# Patient Record
Sex: FEMALE
[De-identification: names, ages, dates, MRNs, and addresses within clinical notes are randomized per-mention and may not be internally consistent; named-entity substitution may affect disease eponyms.]

---

## 2018-01-05 ENCOUNTER — HOSPITAL ENCOUNTER (OUTPATIENT)
Dept: HOSPITAL 90 - EDH | Age: 23
Setting detail: OBSERVATION
LOS: 1 days | Discharge: HOME | End: 2018-01-06
Attending: OBSTETRICS & GYNECOLOGY | Admitting: OBSTETRICS & GYNECOLOGY
Payer: MEDICAID

## 2018-01-05 VITALS — BODY MASS INDEX: 31.82 KG/M2 | HEIGHT: 65 IN | WEIGHT: 191 LBS

## 2018-01-05 DIAGNOSIS — N93.9: Primary | ICD-10-CM

## 2018-01-05 PROCEDURE — 99285 EMERGENCY DEPT VISIT HI MDM: CPT

## 2018-01-05 PROCEDURE — 86850 RBC ANTIBODY SCREEN: CPT

## 2018-01-05 PROCEDURE — 80053 COMPREHEN METABOLIC PANEL: CPT

## 2018-01-05 PROCEDURE — 36415 COLL VENOUS BLD VENIPUNCTURE: CPT

## 2018-01-05 PROCEDURE — 85025 COMPLETE CBC W/AUTO DIFF WBC: CPT

## 2018-01-05 PROCEDURE — 81025 URINE PREGNANCY TEST: CPT

## 2018-01-05 PROCEDURE — 86900 BLOOD TYPING SEROLOGIC ABO: CPT

## 2018-01-05 PROCEDURE — 86922 COMPATIBILITY TEST ANTIGLOB: CPT

## 2018-01-05 PROCEDURE — 81001 URINALYSIS AUTO W/SCOPE: CPT

## 2018-01-05 PROCEDURE — 36430 TRANSFUSION BLD/BLD COMPNT: CPT

## 2018-01-05 PROCEDURE — 88313 SPECIAL STAINS GROUP 2: CPT

## 2018-01-05 PROCEDURE — 76830 TRANSVAGINAL US NON-OB: CPT

## 2018-01-05 PROCEDURE — 85730 THROMBOPLASTIN TIME PARTIAL: CPT

## 2018-01-05 PROCEDURE — 86901 BLOOD TYPING SEROLOGIC RH(D): CPT

## 2018-01-05 PROCEDURE — 85610 PROTHROMBIN TIME: CPT

## 2018-01-06 VITALS — SYSTOLIC BLOOD PRESSURE: 109 MMHG | DIASTOLIC BLOOD PRESSURE: 59 MMHG

## 2018-01-06 VITALS — DIASTOLIC BLOOD PRESSURE: 58 MMHG | SYSTOLIC BLOOD PRESSURE: 96 MMHG

## 2018-01-06 VITALS — SYSTOLIC BLOOD PRESSURE: 118 MMHG | DIASTOLIC BLOOD PRESSURE: 67 MMHG

## 2018-01-06 VITALS — SYSTOLIC BLOOD PRESSURE: 99 MMHG | DIASTOLIC BLOOD PRESSURE: 65 MMHG

## 2018-01-06 LAB
ALBUMIN SERPL-MCNC: 3.4 G/DL (ref 3.5–5)
ALT SERPL-CCNC: 24 U/L (ref 12–78)
APTT PPP: 24.6 SEC (ref 26.3–35.5)
AST SERPL-CCNC: 19 U/L (ref 10–37)
BILIRUB SERPL-MCNC: 0.2 MG/DL (ref 0.2–1)
BUN SERPL-MCNC: 13 MG/DL (ref 7–18)
CHLORIDE SERPL-SCNC: 105 MMOL/L (ref 101–111)
CO2 SERPL-SCNC: 27 MMOL/L (ref 21–32)
CREAT SERPL-MCNC: 0.7 MG/DL (ref 0.5–1.5)
DEPRECATED SQUAMOUS URNS QL MICRO: (no result) /LPF (ref 0–2)
ERYTHROCYTE [DISTWIDTH] IN BLOOD BY AUTOMATED COUNT: 17.3 % (ref 11–15.5)
GFR SERPL CREATININE-BSD FRML MDRD: 110 ML/MIN (ref 60–?)
GLUCOSE SERPL-MCNC: 86 MG/DL (ref 70–105)
HCG UR QL: NEGATIVE
HCT VFR BLD AUTO: 17.5 % (ref 36–48)
HCT VFR BLD AUTO: 23.6 % (ref 36–48)
INR PPP: 0.96 (ref 0.85–1.15)
LYMPHOCYTES NFR BLD MANUAL: 30 % (ref 22–44)
MANUAL DIF COMMENT BLD-IMP: (no result)
MCH RBC QN AUTO: 24.7 PG (ref 27–33)
MCHC RBC AUTO-ENTMCNC: 32 G/DL (ref 32–36)
MCV RBC AUTO: 77.2 FL (ref 79–99)
MONOCYTES NFR BLD MANUAL: 6 % (ref 2–9)
NEUTS SEG NFR BLD MANUAL: 61 % (ref 40–70)
NRBC BLD MANUAL-RTO: 0 % (ref 0–0.19)
PH UR STRIP: 7.5 [PH] (ref 5–8)
PLAT MORPH BLD: ADEQUATE
PLATELET # BLD AUTO: 231 K/UL (ref 130–400)
POTASSIUM SERPL-SCNC: 3.7 MMOL/L (ref 3.5–5.1)
PROT SERPL-MCNC: 6.3 G/DL (ref 6–8.3)
PROTHROMBIN TIME: 10.1 SEC (ref 9.6–11.6)
RBC # BLD AUTO: 2.27 MIL/UL (ref 4–5.5)
RBC #/AREA URNS HPF: (no result) /HPF (ref 0–1)
RBC MORPH BLD: (no result)
SODIUM SERPL-SCNC: 138 MMOL/L (ref 136–145)
SP GR UR STRIP: 1.01 (ref 1–1.03)
UROBILINOGEN UR STRIP-MCNC: 1 MG/DL (ref 0.2–1)
VARIANT LYMPHS NFR BLD MANUAL: 3 % (ref 0–0)
WBC # BLD AUTO: 7.7 K/UL (ref 4.8–10.8)
WBC #/AREA URNS HPF: (no result) /HPF (ref 0–1)

## 2020-05-04 ENCOUNTER — HOSPITAL ENCOUNTER (EMERGENCY)
Dept: HOSPITAL 90 - EDH | Age: 25
Discharge: HOME | End: 2020-05-04
Payer: MEDICAID

## 2020-05-04 DIAGNOSIS — Z3A.13: ICD-10-CM

## 2020-05-04 DIAGNOSIS — O20.0: Primary | ICD-10-CM

## 2020-05-04 LAB
ALBUMIN SERPL-MCNC: 3 G/DL (ref 3.5–5)
ALT SERPL-CCNC: 16 U/L (ref 12–78)
AMPHETAMINES UR QL SCN: NEGATIVE
APPEARANCE UR: CLEAR
AST SERPL-CCNC: 12 U/L (ref 10–37)
BARBITURATES UR QL SCN: NEGATIVE
BASOPHILS NFR BLD AUTO: 0.6 % (ref 0–5)
BENZODIAZ UR QL SCN: NEGATIVE
BILIRUB DIRECT SERPL-MCNC: 0.1 MG/DL (ref 0–0.3)
BILIRUB SERPL-MCNC: 0.4 MG/DL (ref 0.2–1)
BILIRUB UR QL STRIP: NEGATIVE
BUN SERPL-MCNC: 6 MG/DL (ref 7–18)
BZE UR QL SCN: NEGATIVE
CANNABINOIDS UR QL SCN: NEGATIVE
CHLORIDE SERPL-SCNC: 103 MMOL/L (ref 101–111)
CK SERPL-CCNC: 60 U/L (ref 21–232)
CO2 SERPL-SCNC: 23 MMOL/L (ref 21–32)
COLOR UR: (no result)
CREAT SERPL-MCNC: 0.5 MG/DL (ref 0.5–1.5)
EOSINOPHIL NFR BLD AUTO: 4.9 % (ref 0–8)
ERYTHROCYTE [DISTWIDTH] IN BLOOD BY AUTOMATED COUNT: 14 % (ref 11–15.5)
GFR SERPL CREATININE-BSD FRML MDRD: 159 ML/MIN (ref 60–?)
GLUCOSE SERPL-MCNC: 99 MG/DL (ref 70–105)
GLUCOSE UR STRIP-MCNC: NEGATIVE MG/DL
HCT VFR BLD AUTO: 37.6 % (ref 36–48)
HGB UR QL STRIP: (no result)
KETONES UR STRIP-MCNC: NEGATIVE MG/DL
LEUKOCYTE ESTERASE UR QL STRIP: NEGATIVE
LYMPHOCYTES NFR SPEC AUTO: 29.7 % (ref 21–51)
MCH RBC QN AUTO: 30 PG (ref 27–33)
MCHC RBC AUTO-ENTMCNC: 35.1 G/DL (ref 32–36)
MCV RBC AUTO: 85.5 FL (ref 79–99)
MONOCYTES NFR BLD AUTO: 5.8 % (ref 3–13)
NEUTROPHILS NFR BLD AUTO: 57.9 % (ref 40–77)
NITRITE UR QL STRIP: NEGATIVE
NRBC BLD MANUAL-RTO: 0 % (ref 0–0.19)
OPIATES UR QL SCN: NEGATIVE
PCP UR QL SCN: NEGATIVE
PH UR STRIP: 7.5 [PH] (ref 5–8)
PLATELET # BLD AUTO: 180 K/UL (ref 130–400)
POTASSIUM SERPL-SCNC: 3.7 MMOL/L (ref 3.5–5.1)
PROT SERPL-MCNC: 6.3 G/DL (ref 6–8.3)
PROT UR QL STRIP: (no result) MG/DL
RBC # BLD AUTO: 4.4 MIL/UL (ref 4–5.5)
RBC #/AREA URNS HPF: (no result) /HPF (ref 0–1)
SODIUM SERPL-SCNC: 134 MMOL/L (ref 136–145)
SP GR UR STRIP: 1.01 (ref 1–1.03)
UROBILINOGEN UR STRIP-MCNC: 0.2 MG/DL (ref 0.2–1)
WBC # BLD AUTO: 10.7 K/UL (ref 4.8–10.8)
WBC #/AREA URNS HPF: (no result) /HPF (ref 0–1)

## 2020-05-04 PROCEDURE — 85025 COMPLETE CBC W/AUTO DIFF WBC: CPT

## 2020-05-04 PROCEDURE — 36415 COLL VENOUS BLD VENIPUNCTURE: CPT

## 2020-05-04 PROCEDURE — 80305 DRUG TEST PRSMV DIR OPT OBS: CPT

## 2020-05-04 PROCEDURE — 76810 OB US >/= 14 WKS ADDL FETUS: CPT

## 2020-05-04 PROCEDURE — 80048 BASIC METABOLIC PNL TOTAL CA: CPT

## 2020-05-04 PROCEDURE — 80076 HEPATIC FUNCTION PANEL: CPT

## 2020-05-04 PROCEDURE — 81001 URINALYSIS AUTO W/SCOPE: CPT

## 2020-05-04 PROCEDURE — 82550 ASSAY OF CK (CPK): CPT

## 2020-05-04 PROCEDURE — 84702 CHORIONIC GONADOTROPIN TEST: CPT

## 2020-10-09 ENCOUNTER — HOSPITAL ENCOUNTER (INPATIENT)
Dept: HOSPITAL 90 - LDH | Age: 25
LOS: 1 days | Discharge: HOME | DRG: 540 | End: 2020-10-10
Attending: OBSTETRICS & GYNECOLOGY | Admitting: OBSTETRICS & GYNECOLOGY
Payer: MEDICAID

## 2020-10-09 VITALS — SYSTOLIC BLOOD PRESSURE: 114 MMHG | DIASTOLIC BLOOD PRESSURE: 78 MMHG

## 2020-10-09 VITALS — BODY MASS INDEX: 40.82 KG/M2 | HEIGHT: 65 IN | WEIGHT: 245 LBS

## 2020-10-09 VITALS — DIASTOLIC BLOOD PRESSURE: 80 MMHG | SYSTOLIC BLOOD PRESSURE: 120 MMHG

## 2020-10-09 VITALS — SYSTOLIC BLOOD PRESSURE: 117 MMHG | DIASTOLIC BLOOD PRESSURE: 76 MMHG

## 2020-10-09 VITALS — DIASTOLIC BLOOD PRESSURE: 77 MMHG | SYSTOLIC BLOOD PRESSURE: 115 MMHG

## 2020-10-09 VITALS — DIASTOLIC BLOOD PRESSURE: 79 MMHG | SYSTOLIC BLOOD PRESSURE: 120 MMHG

## 2020-10-09 DIAGNOSIS — Z23: ICD-10-CM

## 2020-10-09 DIAGNOSIS — O32.2XX2: ICD-10-CM

## 2020-10-09 DIAGNOSIS — Z3A.37: ICD-10-CM

## 2020-10-09 DIAGNOSIS — O30.033: Primary | ICD-10-CM

## 2020-10-09 LAB
BASOPHILS NFR BLD AUTO: 0.2 % (ref 0–5)
EOSINOPHIL NFR BLD AUTO: 0.4 % (ref 0–8)
ERYTHROCYTE [DISTWIDTH] IN BLOOD BY AUTOMATED COUNT: 13.2 % (ref 11–15.5)
ERYTHROCYTE [DISTWIDTH] IN BLOOD BY AUTOMATED COUNT: 13.4 % (ref 11–15.5)
HCT VFR BLD AUTO: 38.5 % (ref 36–48)
HCT VFR BLD AUTO: 39.9 % (ref 36–48)
LYMPHOCYTES NFR SPEC AUTO: 19.4 % (ref 21–51)
MCH RBC QN AUTO: 31.5 PG (ref 27–33)
MCH RBC QN AUTO: 31.7 PG (ref 27–33)
MCHC RBC AUTO-ENTMCNC: 34.6 G/DL (ref 32–36)
MCHC RBC AUTO-ENTMCNC: 35.6 G/DL (ref 32–36)
MCV RBC AUTO: 89.1 FL (ref 79–99)
MCV RBC AUTO: 91.1 FL (ref 79–99)
MONOCYTES NFR BLD AUTO: 5.1 % (ref 3–13)
NEUTROPHILS NFR BLD AUTO: 74.5 % (ref 40–77)
NRBC BLD MANUAL-RTO: 0 % (ref 0–0.19)
NRBC BLD MANUAL-RTO: 0 % (ref 0–0.19)
PLATELET # BLD AUTO: 142 K/UL (ref 130–400)
PLATELET # BLD AUTO: 148 K/UL (ref 130–400)
RBC # BLD AUTO: 4.32 MIL/UL (ref 4–5.5)
RBC # BLD AUTO: 4.38 MIL/UL (ref 4–5.5)
WBC # BLD AUTO: 10 K/UL (ref 4.8–10.8)
WBC # BLD AUTO: 8 K/UL (ref 4.8–10.8)

## 2020-10-09 PROCEDURE — 90715 TDAP VACCINE 7 YRS/> IM: CPT

## 2020-10-09 PROCEDURE — 3E0234Z INTRODUCTION OF SERUM, TOXOID AND VACCINE INTO MUSCLE, PERCUTANEOUS APPROACH: ICD-10-PCS | Performed by: OBSTETRICS & GYNECOLOGY

## 2020-10-09 PROCEDURE — 59510 CESAREAN DELIVERY: CPT

## 2020-10-09 PROCEDURE — 85025 COMPLETE CBC W/AUTO DIFF WBC: CPT

## 2020-10-09 PROCEDURE — 87340 HEPATITIS B SURFACE AG IA: CPT

## 2020-10-09 PROCEDURE — 86900 BLOOD TYPING SEROLOGIC ABO: CPT

## 2020-10-09 PROCEDURE — 85027 COMPLETE CBC AUTOMATED: CPT

## 2020-10-09 PROCEDURE — 86592 SYPHILIS TEST NON-TREP QUAL: CPT

## 2020-10-09 PROCEDURE — 86901 BLOOD TYPING SEROLOGIC RH(D): CPT

## 2020-10-09 PROCEDURE — 86850 RBC ANTIBODY SCREEN: CPT

## 2020-10-09 PROCEDURE — 36415 COLL VENOUS BLD VENIPUNCTURE: CPT

## 2020-10-09 RX ADMIN — DEXTROSE AND SODIUM CHLORIDE PRN MLS/HR: 5; .45 INJECTION, SOLUTION INTRAVENOUS at 17:07

## 2020-10-09 RX ADMIN — IBUPROFEN SCH MLS/HR: 800 INJECTION INTRAVENOUS at 17:07

## 2020-10-10 VITALS — DIASTOLIC BLOOD PRESSURE: 66 MMHG | SYSTOLIC BLOOD PRESSURE: 107 MMHG

## 2020-10-10 VITALS — SYSTOLIC BLOOD PRESSURE: 114 MMHG | DIASTOLIC BLOOD PRESSURE: 68 MMHG

## 2020-10-10 VITALS — DIASTOLIC BLOOD PRESSURE: 52 MMHG | SYSTOLIC BLOOD PRESSURE: 107 MMHG

## 2020-10-10 LAB
ERYTHROCYTE [DISTWIDTH] IN BLOOD BY AUTOMATED COUNT: 13.4 % (ref 11–15.5)
HCT VFR BLD AUTO: 32.9 % (ref 36–48)
MCH RBC QN AUTO: 31.2 PG (ref 27–33)
MCHC RBC AUTO-ENTMCNC: 34 G/DL (ref 32–36)
MCV RBC AUTO: 91.6 FL (ref 79–99)
NRBC BLD MANUAL-RTO: 0 % (ref 0–0.19)
PLATELET # BLD AUTO: 120 K/UL (ref 130–400)
RBC # BLD AUTO: 3.59 MIL/UL (ref 4–5.5)
WBC # BLD AUTO: 10.5 K/UL (ref 4.8–10.8)

## 2020-10-10 RX ADMIN — Medication PRN MG: at 14:18

## 2020-10-10 RX ADMIN — DEXTROSE AND SODIUM CHLORIDE PRN MLS/HR: 5; .45 INJECTION, SOLUTION INTRAVENOUS at 01:15

## 2020-10-10 RX ADMIN — Medication PRN MG: at 09:02

## 2020-10-10 RX ADMIN — IBUPROFEN SCH MLS/HR: 800 INJECTION INTRAVENOUS at 01:15

## 2020-10-10 NOTE — NUR
DISCHARGE INSTRUCTIONS GIVEN AND PATIENT VERBALIZED UNDERSTANDING INSTRUCTIONS GIVEN.  
SCRIPT FOR PAIN MANAGEMENT GIVEN AND AND DOSAGE AND FREQUENCY OF MEDICATIONS REVIEWED.  
PATIENT ALLOWED TO ASK QUESTIONS.  VERBALIZES PASSING GAS AND REFUSED TO GET DULCOLAX 
SUPPOSITORY.  PRUNE JUICE GIVEN AS REQUESTED BY PATIENT.

## 2020-10-10 NOTE — NUR
PATIENT WAS ASSISTED UP TO BATHROOM AND VOIDED 400CC AND TOLERATED ACTIVITY WELL.  PATIENT 
WAS ASSESSED AND DRESSING TO ABDOMEN WAS REMOVED AND INCISION HAS NO DRAINAGE, SWELLING OR 
REDNESS.  NON ADHESIVE PAD APPLIED TO INCISIONAL SITE AND PATIENT WAS INSTRUCTED ON 
INCISIONAL CARE AFTER DISCHARGE.

## 2020-10-10 NOTE — NUR
PATIENT WAS TAKEN VIA W/C CARRYING BOTH BABIES IN ARMS AND WAS DISCHARGED TO HER SPOUSE IN 
STABLE CONDITION.

## 2020-10-10 NOTE — NUR
SHAMIR JONES, [facility] DONE PT SAT UP AT BEDSIDE BY PEDRO FREEMAN TO CALL FOR ASSIST 
BEFORE GETTING OUT OF BED, VERBALIZED UNDERSTANDING. 

-------------------------------------------------------------------------------

Addendum: 10/10/20 at 0707 by TALIA GIL RN RN

-------------------------------------------------------------------------------

Amended: Links added.

## 2021-02-22 ENCOUNTER — HOSPITAL ENCOUNTER (OUTPATIENT)
Dept: HOSPITAL 90 - LAB | Age: 26
Discharge: HOME | End: 2021-02-22
Attending: OBSTETRICS & GYNECOLOGY
Payer: MEDICAID

## 2021-02-22 DIAGNOSIS — R06.02: Primary | ICD-10-CM

## 2021-02-22 PROCEDURE — 93005 ELECTROCARDIOGRAM TRACING: CPT

## 2023-08-07 ENCOUNTER — HOSPITAL ENCOUNTER (OUTPATIENT)
Dept: HOSPITAL 90 - EDH | Age: 28
Setting detail: OBSERVATION
LOS: 1 days | Discharge: HOME | End: 2023-08-08
Attending: INTERNAL MEDICINE | Admitting: INTERNAL MEDICINE
Payer: MEDICAID

## 2023-08-07 VITALS — RESPIRATION RATE: 18 BRPM | DIASTOLIC BLOOD PRESSURE: 79 MMHG | HEART RATE: 79 BPM | SYSTOLIC BLOOD PRESSURE: 121 MMHG

## 2023-08-07 VITALS — HEIGHT: 65 IN | BODY MASS INDEX: 38.82 KG/M2 | WEIGHT: 233.03 LBS

## 2023-08-07 VITALS — OXYGEN SATURATION: 100 %

## 2023-08-07 DIAGNOSIS — Z51.5: ICD-10-CM

## 2023-08-07 DIAGNOSIS — N83.291: ICD-10-CM

## 2023-08-07 DIAGNOSIS — D65: ICD-10-CM

## 2023-08-07 DIAGNOSIS — E11.9: ICD-10-CM

## 2023-08-07 DIAGNOSIS — Z20.822: ICD-10-CM

## 2023-08-07 DIAGNOSIS — I10: ICD-10-CM

## 2023-08-07 DIAGNOSIS — N92.0: Primary | ICD-10-CM

## 2023-08-07 DIAGNOSIS — D64.9: ICD-10-CM

## 2023-08-07 DIAGNOSIS — Z79.899: ICD-10-CM

## 2023-08-07 DIAGNOSIS — N39.0: ICD-10-CM

## 2023-08-07 LAB
ALBUMIN SERPL-MCNC: 3.3 G/DL (ref 3.5–5)
ALT SERPL-CCNC: 49 U/L (ref 12–78)
APPEARANCE UR: (no result)
AST SERPL-CCNC: 31 U/L (ref 10–37)
BASOPHILS # BLD AUTO: 0.05 K/UL (ref 0–0.2)
BASOPHILS NFR BLD AUTO: 0.6 % (ref 0–5)
BILIRUB SERPL-MCNC: 0.3 MG/DL (ref 0.2–1)
BILIRUB UR QL STRIP: (no result) MG/DL
BUN SERPL-MCNC: 6 MG/DL (ref 7–18)
CHLORIDE SERPL-SCNC: 105 MMOL/L (ref 101–111)
CO2 SERPL-SCNC: 29 MMOL/L (ref 21–32)
COLOR UR: (no result)
CREAT SERPL-MCNC: 0.7 MG/DL (ref 0.5–1.5)
EOSINOPHIL # BLD AUTO: 0.21 K/UL (ref 0–0.7)
EOSINOPHIL NFR BLD AUTO: 2.3 % (ref 0–8)
ERYTHROCYTE [DISTWIDTH] IN BLOOD BY AUTOMATED COUNT: 12.7 % (ref 11–15.5)
GFR SERPL CREATININE-BSD FRML MDRD: 120 ML/MIN (ref 90–?)
GLUCOSE SERPL-MCNC: 83 MG/DL (ref 70–105)
GLUCOSE UR STRIP-MCNC: 100 MG/DL
HCG UR QL: NEGATIVE
HCT VFR BLD AUTO: 26.8 % (ref 36–48)
HCT VFR BLD AUTO: 29.2 % (ref 36–48)
HGB UR QL STRIP: (no result)
IMM GRANULOCYTES # BLD: 0.09 K/UL (ref 0–1)
KETONES UR STRIP-MCNC: 15 MG/DL
LEUKOCYTE ESTERASE UR QL STRIP: (no result) LEU/UL
LYMPHOCYTES # SPEC AUTO: 3.5 K/UL (ref 1–4.8)
LYMPHOCYTES NFR SPEC AUTO: 39.1 % (ref 21–51)
MCH RBC QN AUTO: 30.2 PG (ref 27–33)
MCHC RBC AUTO-ENTMCNC: 35.3 G/DL (ref 32–36)
MCV RBC AUTO: 85.6 FL (ref 79–99)
MICRO URNS: YES
MONOCYTES # BLD AUTO: 0.5 K/UL (ref 0.1–1)
MONOCYTES NFR BLD AUTO: 5.2 % (ref 3–13)
NEUTROPHILS # BLD AUTO: 4.7 K/UL (ref 1.8–7.7)
NEUTROPHILS NFR BLD AUTO: 51.8 % (ref 40–77)
NITRITE UR QL STRIP: POSITIVE
NRBC BLD MANUAL-RTO: 0 % (ref 0–0.19)
PH UR STRIP: 6.5 [PH] (ref 5–8)
PLATELET # BLD AUTO: 235 K/UL (ref 130–400)
POTASSIUM SERPL-SCNC: 4.1 MMOL/L (ref 3.5–5.1)
PROT SERPL-MCNC: 6.2 G/DL (ref 6–8.3)
PROT UR QL STRIP: >=300 MG/DL
RBC # BLD AUTO: 3.41 MIL/UL (ref 4–5.5)
RBC #/AREA URNS HPF: (no result) /HPF (ref 0–1)
SODIUM SERPL-SCNC: 138 MMOL/L (ref 136–145)
SP GR UR STRIP: 1.01 (ref 1–1.03)
UROBILINOGEN UR STRIP-MCNC: 4 MG/DL (ref 0.2–1)
WBC # BLD AUTO: 9 K/UL (ref 4.8–10.8)
WBC #/AREA URNS HPF: (no result) /HPF (ref 0–1)

## 2023-08-07 PROCEDURE — 80053 COMPREHEN METABOLIC PANEL: CPT

## 2023-08-07 PROCEDURE — 58558 HYSTEROSCOPY BIOPSY: CPT

## 2023-08-07 PROCEDURE — 87635 SARS-COV-2 COVID-19 AMP PRB: CPT

## 2023-08-07 PROCEDURE — G0378 HOSPITAL OBSERVATION PER HR: HCPCS

## 2023-08-07 PROCEDURE — 87186 SC STD MICRODIL/AGAR DIL: CPT

## 2023-08-07 PROCEDURE — 96375 TX/PRO/DX INJ NEW DRUG ADDON: CPT

## 2023-08-07 PROCEDURE — 96365 THER/PROPH/DIAG IV INF INIT: CPT

## 2023-08-07 PROCEDURE — 87088 URINE BACTERIA CULTURE: CPT

## 2023-08-07 PROCEDURE — 81025 URINE PREGNANCY TEST: CPT

## 2023-08-07 PROCEDURE — 80048 BASIC METABOLIC PNL TOTAL CA: CPT

## 2023-08-07 PROCEDURE — 86900 BLOOD TYPING SEROLOGIC ABO: CPT

## 2023-08-07 PROCEDURE — A4606 OXYGEN PROBE USED W OXIMETER: HCPCS

## 2023-08-07 PROCEDURE — 85018 HEMOGLOBIN: CPT

## 2023-08-07 PROCEDURE — 85025 COMPLETE CBC W/AUTO DIFF WBC: CPT

## 2023-08-07 PROCEDURE — 86901 BLOOD TYPING SEROLOGIC RH(D): CPT

## 2023-08-07 PROCEDURE — 36415 COLL VENOUS BLD VENIPUNCTURE: CPT

## 2023-08-07 PROCEDURE — 88305 TISSUE EXAM BY PATHOLOGIST: CPT

## 2023-08-07 PROCEDURE — 76856 US EXAM PELVIC COMPLETE: CPT

## 2023-08-07 PROCEDURE — 86850 RBC ANTIBODY SCREEN: CPT

## 2023-08-07 PROCEDURE — 85014 HEMATOCRIT: CPT

## 2023-08-07 PROCEDURE — 81001 URINALYSIS AUTO W/SCOPE: CPT

## 2023-08-07 PROCEDURE — 87077 CULTURE AEROBIC IDENTIFY: CPT

## 2023-08-07 PROCEDURE — 96366 THER/PROPH/DIAG IV INF ADDON: CPT

## 2023-08-07 PROCEDURE — 99284 EMERGENCY DEPT VISIT MOD MDM: CPT

## 2023-08-07 RX ADMIN — SODIUM CHLORIDE SCH GM: 9 INJECTION, SOLUTION INTRAVENOUS at 21:59

## 2023-08-08 VITALS — DIASTOLIC BLOOD PRESSURE: 54 MMHG | RESPIRATION RATE: 16 BRPM | HEART RATE: 78 BPM | SYSTOLIC BLOOD PRESSURE: 102 MMHG

## 2023-08-08 VITALS — HEART RATE: 93 BPM | DIASTOLIC BLOOD PRESSURE: 69 MMHG | RESPIRATION RATE: 18 BRPM | SYSTOLIC BLOOD PRESSURE: 116 MMHG

## 2023-08-08 VITALS — DIASTOLIC BLOOD PRESSURE: 66 MMHG | SYSTOLIC BLOOD PRESSURE: 112 MMHG | RESPIRATION RATE: 18 BRPM | HEART RATE: 79 BPM

## 2023-08-08 VITALS — SYSTOLIC BLOOD PRESSURE: 122 MMHG | RESPIRATION RATE: 13 BRPM | HEART RATE: 90 BPM | DIASTOLIC BLOOD PRESSURE: 74 MMHG

## 2023-08-08 VITALS — DIASTOLIC BLOOD PRESSURE: 79 MMHG | HEART RATE: 87 BPM | SYSTOLIC BLOOD PRESSURE: 121 MMHG | RESPIRATION RATE: 15 BRPM

## 2023-08-08 VITALS — RESPIRATION RATE: 15 BRPM | HEART RATE: 87 BPM | DIASTOLIC BLOOD PRESSURE: 83 MMHG | SYSTOLIC BLOOD PRESSURE: 112 MMHG

## 2023-08-08 VITALS — RESPIRATION RATE: 14 BRPM | SYSTOLIC BLOOD PRESSURE: 118 MMHG | HEART RATE: 84 BPM | DIASTOLIC BLOOD PRESSURE: 67 MMHG

## 2023-08-08 VITALS — HEART RATE: 82 BPM | RESPIRATION RATE: 18 BRPM | DIASTOLIC BLOOD PRESSURE: 66 MMHG | SYSTOLIC BLOOD PRESSURE: 106 MMHG

## 2023-08-08 VITALS — SYSTOLIC BLOOD PRESSURE: 106 MMHG | DIASTOLIC BLOOD PRESSURE: 62 MMHG | RESPIRATION RATE: 18 BRPM | HEART RATE: 88 BPM

## 2023-08-08 VITALS — DIASTOLIC BLOOD PRESSURE: 83 MMHG | RESPIRATION RATE: 17 BRPM | HEART RATE: 82 BPM | SYSTOLIC BLOOD PRESSURE: 118 MMHG

## 2023-08-08 VITALS — HEART RATE: 76 BPM | SYSTOLIC BLOOD PRESSURE: 112 MMHG | RESPIRATION RATE: 18 BRPM | DIASTOLIC BLOOD PRESSURE: 71 MMHG

## 2023-08-08 VITALS — SYSTOLIC BLOOD PRESSURE: 112 MMHG | DIASTOLIC BLOOD PRESSURE: 80 MMHG | HEART RATE: 81 BPM | RESPIRATION RATE: 20 BRPM

## 2023-08-08 VITALS — HEART RATE: 96 BPM | SYSTOLIC BLOOD PRESSURE: 124 MMHG | DIASTOLIC BLOOD PRESSURE: 76 MMHG | RESPIRATION RATE: 13 BRPM

## 2023-08-08 VITALS — SYSTOLIC BLOOD PRESSURE: 122 MMHG | HEART RATE: 79 BPM | DIASTOLIC BLOOD PRESSURE: 66 MMHG | RESPIRATION RATE: 15 BRPM

## 2023-08-08 VITALS — HEART RATE: 85 BPM | RESPIRATION RATE: 13 BRPM | DIASTOLIC BLOOD PRESSURE: 71 MMHG | SYSTOLIC BLOOD PRESSURE: 120 MMHG

## 2023-08-08 VITALS — SYSTOLIC BLOOD PRESSURE: 124 MMHG | HEART RATE: 85 BPM | RESPIRATION RATE: 18 BRPM | DIASTOLIC BLOOD PRESSURE: 81 MMHG

## 2023-08-08 VITALS — RESPIRATION RATE: 13 BRPM | SYSTOLIC BLOOD PRESSURE: 125 MMHG | DIASTOLIC BLOOD PRESSURE: 75 MMHG | HEART RATE: 99 BPM

## 2023-08-08 VITALS — RESPIRATION RATE: 18 BRPM | DIASTOLIC BLOOD PRESSURE: 82 MMHG | SYSTOLIC BLOOD PRESSURE: 133 MMHG | HEART RATE: 78 BPM

## 2023-08-08 VITALS — RESPIRATION RATE: 18 BRPM | SYSTOLIC BLOOD PRESSURE: 112 MMHG | HEART RATE: 86 BPM | DIASTOLIC BLOOD PRESSURE: 67 MMHG

## 2023-08-08 VITALS — RESPIRATION RATE: 13 BRPM | HEART RATE: 93 BPM | SYSTOLIC BLOOD PRESSURE: 121 MMHG | DIASTOLIC BLOOD PRESSURE: 78 MMHG

## 2023-08-08 VITALS — SYSTOLIC BLOOD PRESSURE: 121 MMHG | RESPIRATION RATE: 14 BRPM | DIASTOLIC BLOOD PRESSURE: 75 MMHG | HEART RATE: 88 BPM

## 2023-08-08 VITALS — SYSTOLIC BLOOD PRESSURE: 119 MMHG | RESPIRATION RATE: 16 BRPM | HEART RATE: 84 BPM | DIASTOLIC BLOOD PRESSURE: 72 MMHG

## 2023-08-08 VITALS — RESPIRATION RATE: 16 BRPM | DIASTOLIC BLOOD PRESSURE: 75 MMHG | SYSTOLIC BLOOD PRESSURE: 136 MMHG | HEART RATE: 73 BPM

## 2023-08-08 VITALS — RESPIRATION RATE: 16 BRPM | SYSTOLIC BLOOD PRESSURE: 102 MMHG | HEART RATE: 82 BPM | DIASTOLIC BLOOD PRESSURE: 61 MMHG

## 2023-08-08 VITALS — DIASTOLIC BLOOD PRESSURE: 69 MMHG | RESPIRATION RATE: 16 BRPM | HEART RATE: 84 BPM | SYSTOLIC BLOOD PRESSURE: 118 MMHG

## 2023-08-08 LAB
BASOPHILS # BLD AUTO: 0.04 K/UL (ref 0–0.2)
BASOPHILS NFR BLD AUTO: 0.5 % (ref 0–5)
BUN SERPL-MCNC: 11 MG/DL (ref 7–18)
CHLORIDE SERPL-SCNC: 108 MMOL/L (ref 101–111)
CO2 SERPL-SCNC: 29 MMOL/L (ref 21–32)
CREAT SERPL-MCNC: 0.7 MG/DL (ref 0.5–1.5)
EOSINOPHIL # BLD AUTO: 0.19 K/UL (ref 0–0.7)
EOSINOPHIL NFR BLD AUTO: 2.2 % (ref 0–8)
ERYTHROCYTE [DISTWIDTH] IN BLOOD BY AUTOMATED COUNT: 12.7 % (ref 11–15.5)
GFR SERPL CREATININE-BSD FRML MDRD: 120 ML/MIN (ref 90–?)
GLUCOSE SERPL-MCNC: 99 MG/DL (ref 70–105)
HCT VFR BLD AUTO: 25.6 % (ref 36–48)
IMM GRANULOCYTES # BLD: 0.05 K/UL (ref 0–1)
LYMPHOCYTES # SPEC AUTO: 3.5 K/UL (ref 1–4.8)
LYMPHOCYTES NFR SPEC AUTO: 41.2 % (ref 21–51)
MCH RBC QN AUTO: 30.8 PG (ref 27–33)
MCHC RBC AUTO-ENTMCNC: 35.2 G/DL (ref 32–36)
MCV RBC AUTO: 87.7 FL (ref 79–99)
MONOCYTES # BLD AUTO: 0.4 K/UL (ref 0.1–1)
MONOCYTES NFR BLD AUTO: 5.1 % (ref 3–13)
NEUTROPHILS # BLD AUTO: 4.3 K/UL (ref 1.8–7.7)
NEUTROPHILS NFR BLD AUTO: 50.4 % (ref 40–77)
NRBC BLD MANUAL-RTO: 0 % (ref 0–0.19)
PLATELET # BLD AUTO: 194 K/UL (ref 130–400)
POTASSIUM SERPL-SCNC: 3.7 MMOL/L (ref 3.5–5.1)
RBC # BLD AUTO: 2.92 MIL/UL (ref 4–5.5)
SARS-COV-2 RNA SPEC QL NAA+PROBE: (no result)
SODIUM SERPL-SCNC: 143 MMOL/L (ref 136–145)
WBC # BLD AUTO: 8.6 K/UL (ref 4.8–10.8)

## 2023-08-08 RX ADMIN — FAMOTIDINE SCH MG: 20 TABLET, FILM COATED ORAL at 20:42

## 2023-08-08 RX ADMIN — SODIUM CHLORIDE SCH GM: 9 INJECTION, SOLUTION INTRAVENOUS at 21:40

## 2023-08-08 RX ADMIN — FAMOTIDINE SCH MG: 20 TABLET, FILM COATED ORAL at 07:46

## 2023-08-08 RX ADMIN — SODIUM CHLORIDE PRN MG: 9 INJECTION, SOLUTION INTRAVENOUS at 19:10
